# Patient Record
Sex: MALE | Race: WHITE | NOT HISPANIC OR LATINO | Employment: OTHER | URBAN - METROPOLITAN AREA
[De-identification: names, ages, dates, MRNs, and addresses within clinical notes are randomized per-mention and may not be internally consistent; named-entity substitution may affect disease eponyms.]

---

## 2024-02-16 ENCOUNTER — OFFICE VISIT (OUTPATIENT)
Dept: FAMILY MEDICINE CLINIC | Facility: CLINIC | Age: 64
End: 2024-02-16
Payer: MEDICARE

## 2024-02-16 VITALS
OXYGEN SATURATION: 93 % | RESPIRATION RATE: 20 BRPM | TEMPERATURE: 97.3 F | DIASTOLIC BLOOD PRESSURE: 68 MMHG | WEIGHT: 268.6 LBS | HEIGHT: 70 IN | SYSTOLIC BLOOD PRESSURE: 118 MMHG | BODY MASS INDEX: 38.45 KG/M2 | HEART RATE: 90 BPM

## 2024-02-16 DIAGNOSIS — I48.21 PERMANENT ATRIAL FIBRILLATION: Primary | ICD-10-CM

## 2024-02-16 DIAGNOSIS — F17.210 CIGARETTE SMOKER: ICD-10-CM

## 2024-02-16 DIAGNOSIS — R41.3 MEMORY LOSS: ICD-10-CM

## 2024-02-16 DIAGNOSIS — I83.90 VARICOSE VEINS OF ANKLE: ICD-10-CM

## 2024-02-16 DIAGNOSIS — G93.1 ANOXIC BRAIN INJURY: ICD-10-CM

## 2024-02-16 DIAGNOSIS — R63.5 WEIGHT GAIN FOLLOWING GASTRIC BYPASS SURGERY: ICD-10-CM

## 2024-02-16 DIAGNOSIS — Z98.84 WEIGHT GAIN FOLLOWING GASTRIC BYPASS SURGERY: ICD-10-CM

## 2024-02-16 PROBLEM — I10 PRIMARY HYPERTENSION: Status: ACTIVE | Noted: 2024-02-16

## 2024-02-16 PROCEDURE — 3074F SYST BP LT 130 MM HG: CPT | Performed by: FAMILY MEDICINE

## 2024-02-16 PROCEDURE — 99204 OFFICE O/P NEW MOD 45 MIN: CPT | Performed by: FAMILY MEDICINE

## 2024-02-16 PROCEDURE — 3078F DIAST BP <80 MM HG: CPT | Performed by: FAMILY MEDICINE

## 2024-02-16 RX ORDER — LISINOPRIL 5 MG/1
5 TABLET ORAL DAILY
COMMUNITY

## 2024-03-10 ENCOUNTER — PATIENT MESSAGE (OUTPATIENT)
Dept: FAMILY MEDICINE CLINIC | Facility: CLINIC | Age: 64
End: 2024-03-10
Payer: MEDICARE

## 2024-03-10 DIAGNOSIS — M25.562 CHRONIC PAIN OF LEFT KNEE: Primary | ICD-10-CM

## 2024-03-10 DIAGNOSIS — G89.29 CHRONIC PAIN OF LEFT KNEE: Primary | ICD-10-CM

## 2024-03-11 NOTE — TELEPHONE ENCOUNTER
From: Paul Carbajal  To: Zeyad Rouse  Sent: 3/10/2024 9:18 PM EDT  Subject: Health Update    Dr. Rouse, this is Merly, mother of Paul Carbajal. We are new to you and saw you about a month ago. 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 I was so focused on being sure you knew about his cardiac arrest that I completely forgot to let you know about his leg/knee infection. In 2020 he developed an infection just under the right knee. He did surgery and removed the infection and inserted antibiotic beads. He seemed to be recovering. We moved from [place] to [place] in December 2021 and shortly after, the infection resurfaced. He was referred to an orthopedic surgeon at . He stated that the infection was likely caused when they jammed a needle into his knee to start the IV at the time of his cardiac arrest. He was keeping an eye on it and felt surgery was probably the best solution but my son was very resistant to that. I could not be persistent with that as I started having health issues   myself. Now his left leg is hurting and he can barely walk but not sure why.   I also neglected to tell you that we obtained health insurance for him and his care was taken over by [facility] in [place]. That is where he was also treated for the knee/leg infection and his care was continued there until we moved to [place].    I thank you and appreciate you very much. If you have any suggestions for helping with his pain in the left leg I would appreciate it.

## 2025-02-19 DIAGNOSIS — I48.21 PERMANENT ATRIAL FIBRILLATION: ICD-10-CM

## 2025-02-19 RX ORDER — APIXABAN 5 MG/1
5 TABLET, FILM COATED ORAL 2 TIMES DAILY
Qty: 60 TABLET | Refills: 1 | Status: SHIPPED | OUTPATIENT
Start: 2025-02-19

## 2025-04-07 ENCOUNTER — TELEPHONE (OUTPATIENT)
Dept: FAMILY MEDICINE CLINIC | Facility: CLINIC | Age: 65
End: 2025-04-07

## 2025-04-07 NOTE — TELEPHONE ENCOUNTER
Caller: DENNISE ALEXANDER    Relationship to patient: Mother    Best call back number: [phone]     Type of visit: SUBSEQUENT MEDICARE WELLNESS VISIT    Requested date: 04/15/25     If rescheduling, when is the original appointment: 04/07/25    Additional notes:PATIENT HAD TO RESCHEDULE APPOINTMENT DUE TO FLOODS BUT WILL BE SEEING A PA INSTEAD OF HIS PCP DUE TO WANTING TO GET IN [place]ON[place]

## 2025-04-15 ENCOUNTER — OFFICE VISIT (OUTPATIENT)
Dept: FAMILY MEDICINE CLINIC | Facility: CLINIC | Age: 65
End: 2025-04-15
Payer: MEDICARE

## 2025-04-15 VITALS
HEIGHT: 70 IN | BODY MASS INDEX: 38.08 KG/M2 | WEIGHT: 266 LBS | HEART RATE: 88 BPM | SYSTOLIC BLOOD PRESSURE: 126 MMHG | RESPIRATION RATE: 18 BRPM | DIASTOLIC BLOOD PRESSURE: 78 MMHG | OXYGEN SATURATION: 95 %

## 2025-04-15 DIAGNOSIS — Z12.2 ENCOUNTER FOR SCREENING FOR LUNG CANCER: ICD-10-CM

## 2025-04-15 DIAGNOSIS — I48.21 PERMANENT ATRIAL FIBRILLATION: ICD-10-CM

## 2025-04-15 DIAGNOSIS — Z87.891 PERSONAL HISTORY OF NICOTINE DEPENDENCE: ICD-10-CM

## 2025-04-15 DIAGNOSIS — H93.11 TINNITUS OF RIGHT EAR: ICD-10-CM

## 2025-04-15 DIAGNOSIS — Z00.00 MEDICARE ANNUAL WELLNESS VISIT, SUBSEQUENT: Primary | ICD-10-CM

## 2025-04-15 DIAGNOSIS — G47.00 INSOMNIA, UNSPECIFIED TYPE: ICD-10-CM

## 2025-04-15 RX ORDER — TRAZODONE HYDROCHLORIDE 50 MG/1
50 TABLET ORAL NIGHTLY
Qty: 30 TABLET | Refills: 5 | Status: SHIPPED | OUTPATIENT
Start: 2025-04-15

## 2025-04-15 NOTE — PROGRESS NOTES
QUICK REFERENCE INFORMATION:  The ABCs of Providing the Annual Wellness Visit   CMS[employer] Medicare Subsequent Wellness Visit      Subjective   History of Present Illness    Paul Carbajal is a 64 y.o. male who presents for an Subsequent Wellness Visit. In addition, we addressed the following health issues:    Past medical history is significant for atrial fibrillation and hypertension.    Atrial fibrillation is currently managed with Eliquis.  Patient is tolerating medication well and denies adverse effects.  Patient has not seen cardiology in quite some time but is not interested in a referral.  Denies any recent A-fib episodes.  Denies chest pain or shortness of breath.    Hypertension is currently well-controlled without medication.    Complains of ringing in the right ear for the last 2 years.  Denies any dizziness, nausea, or vomiting.  Admits that the ringing is constant.  Has not had his hearing checked in since symptoms began.  No recent falls.  Does admit to some difficulty with movement and balance but attributes that to strength and coordination.  Admits to a cognitive deficit that is chronic since previous anoxic brain injury, denies any recent changes in cognition.    Also complains of difficulty staying asleep.  States he will fall asleep easily but will wake multiple times throughout the night.  Has tried melatonin OTC but experienced side effects including hallucinations and agitation.    Admits to a well-balanced diet.  Drinks plenty of water.  Admits to a sedentary lifestyle.    PMH, PSH, SocHx, FamHx, Allergies, and Medications: Reviewed and updated in the Visit Navigator.     Outpatient Medications Prior to Visit   Medication Sig Dispense Refill    apixaban (Eliquis) 5 MG tablet tablet TAKE 1 TABLET BY MOUTH TWICE DAILY 60 tablet 1    lisinopril (PRINIVIL,ZESTRIL) 5 MG tablet Take 1 tablet by mouth Daily. (Patient not taking: Reported on 2/16/2024)       No  facility-administered medications prior to visit.       Patient Active Problem List   Diagnosis    Permanent atrial fibrillation    Memory loss    Cigarette smoker    Weight gain following gastric bypass surgery    Varicose veins of ankle    Anoxic brain injury    Primary hypertension       Health Habits:  Dental Exam. not up to date - no teeth  Eye Exam. not up to date - 2017  Exercise: 0 times/week.  Current exercise activities include: none    Social:  See review in SnapShot activity and in SocHx section of Visit Navigator.    Health Risk Assessment:  The patient has completed a Health Risk Assessment. This has been reviewed with them and has been scanned into Media Manager as a separate document.    Current Medical Providers:  Patient Care Team:  Zeyad Rouse MD as PCP - General (Family Medicine)    The [facility] providers who are involved in the care of this patient are listed above. Additional providers and suppliers are listed below:  None    Recent Hospitalizations:  No recent hospitalization(s)..    Age-appropriate Screening Schedule:  Refer to the list below for future screening recommendations based on patient's age. Orders for these recommended tests are listed in the plan section. The patient has been provided with a written plan.    Health Maintenance   Topic Date Due    Pneumococcal Vaccine 50+ (1 of 2 - PCV) Never done    TDAP/TD VACCINES (1 - Tdap) Never done    ZOSTER VACCINE (1 of 2) Never done    LUNG CANCER SCREENING  Never done    COVID-19 Vaccine (1 - 2024-25 season) Never done    COLORECTAL CANCER SCREENING  04/15/2026 (Originally 8/2/2006)    INFLUENZA VACCINE  07/01/2025    ANNUAL WELLNESS VISIT  04/15/2026    HEPATITIS C SCREENING  Completed       Depression Screen:       4/15/2025     2:09 PM   PHQ-2/PHQ-9 Depression Screening   Little interest or pleasure in doing things Almost all   Feeling down, depressed, or hopeless Several days   Trouble falling or staying asleep, or  sleeping too much Almost all   Feeling tired or having little energy Almost all   Poor appetite or overeating Not at all   Feeling bad about yourself - or that you are a failure or have let yourself or your family down Not at all   Trouble concentrating on things, such as reading the newspaper or watching television Not at all   Moving or speaking so slowly that other people could have noticed? Or the opposite - being so fidgety or restless that you have been moving around a lot more than usual. Not at all   Thoughts that you would be better off dead or hurting yourself in some way Not at all   Patient Health Questionnaire-9 Score 10   How difficult have these problems made it for you to do your work, take care of things at home, or get along with other people? Not difficult at all       Functional and Cognitive Screenin/11/2025    11:34 AM   Functional & Cognitive Status   Do you have difficulty preparing food and eating? No   Do you have difficulty bathing yourself, getting dressed or grooming yourself? No   Do you have difficulty using the toilet? No   Do you have difficulty moving around from place to place? No   Do you have trouble with steps or getting out of a bed or a chair? No   Current Diet Limited Junk Food   Dental Exam Other   Eye Exam Other   Exercise (times per week) 0 times per week   Current Exercises Include No Regular Exercise   Do you need help using the phone?  No   Are you deaf or do you have serious difficulty hearing?  No   Do you need help to go to places out of walking distance? No   Do you need help shopping? No   Do you need help preparing meals?  Yes   Do you need help with housework?  Yes   Do you need help with laundry? Yes   Do you need help taking your medications? Yes   Do you need help managing money? Yes   Do you ever drive or ride in a car without wearing a seat belt? No   Have you felt unusual stress, anger or loneliness in the last month? No   Who do you live with?  "Other   If you need help, do you have trouble finding someone available to you? No   Have you been bothered in the last four weeks by sexual problems? No   Do you have difficulty concentrating, remembering or making decisions? Yes       Does the patient have evidence of cognitive impairment? Yes    Identification of Risk Factors:  Risk factors include: Abdominal Aortic Aneurysm Screening  Colon Cancer Screening  Diabetic Lab Screening   Immunizations Discussed/Encouraged (specific immunizations; Tdap, Prevnar 20 (Pneumococcal 20-valent conjugate), and Shingrix )  Lung Cancer Risk  Prostate Cancer Screening .    Review of Systems    A comprehensive review of systems was negative except for: Ears, nose, mouth, throat, and face: positive for tinnitus    Objective     Vitals:    04/15/25 1408   BP: 126/78   BP Location: Right arm   Patient Position: Sitting   Cuff Size: Adult   Pulse: 88   Resp: 18   SpO2: 95%   Weight: 121 kg (266 lb)   Height: 177.8 cm (70\")   PainSc: 0-No pain      Physical Exam  Vitals and nursing note reviewed.   Constitutional:       Appearance: Normal appearance.   HENT:      Head: Normocephalic and atraumatic.      Right Ear: Ear canal and external ear normal. There is impacted cerumen.      Left Ear: Tympanic membrane, ear canal and external ear normal.      Nose: Nose normal.      Mouth/Throat:      Pharynx: Oropharynx is clear.   Eyes:      Conjunctiva/sclera: Conjunctivae normal.   Cardiovascular:      Rate and Rhythm: Normal rate and regular rhythm.      Heart sounds: No murmur heard.     No friction rub. No gallop.   Pulmonary:      Effort: Pulmonary effort is normal.      Breath sounds: Wheezing present. No rhonchi or rales.   Abdominal:      General: Abdomen is flat. Bowel sounds are normal. There is no distension.      Palpations: Abdomen is soft.      Tenderness: There is no abdominal tenderness. There is no guarding.   Lymphadenopathy:      Head:      Right side of head: No " submandibular or tonsillar adenopathy.      Left side of head: No submandibular or tonsillar adenopathy.   Skin:     General: Skin is warm and dry.   Neurological:      General: No focal deficit present.      Mental Status: He is alert and oriented to person, place, and time.   Psychiatric:         Mood and Affect: Mood normal.         Behavior: Behavior normal.          Body mass index is 38.17 kg/m².    Assessment & Plan   Patient Self-Management and Personalized Health Advice  The patient has been provided with information about: diet, exercise, prevention of cardiac or vascular disease, tobacco cessation, and fall prevention and preventive services including:   Annual Wellness Visit (AWV)  Cardiovascular Disease Screening Tests (may do this order every 5 years in beneficiaries without signs or symptoms of cardiovascular disease)  Colorectal Cancer Screening, Colonoscopy  Counseling to Prevent Tobacco Use (use of smartset and @cessation@ smartphrase for documentation)  Depression Screening (15 minutes face to face, Code )  Lung Cancer Screening Counseling and Annual Screening for Lung Cancer with Low Dose Computed Tomography (LDCT); (use of smartset for low dose CT for lung cancer screening for documentation and orders)  Ultrasound Screening for Abdominal Aortic Aneurysm (AAA).    Discussed the patient's BMI with him. The BMI is above average; BMI management plan is completed.    Orders:  Orders Placed This Encounter   Procedures     CT Chest Low Dose Cancer Screening WO    Comprehensive Metabolic Panel    Lipid Panel    Hemoglobin A1c    Ambulatory Referral to Audiology    CBC & Differential     Discussed the importance of a healthy, well-balanced diet, active lifestyle, and adequate hydration.  Patient denied colorectal cancer screening and screening for an abdominal aneurysm.  Complete blood work and lung cancer screening.  Discussed vaccinations for patient to consider.  Will refer to audiology for  hearing screening.  Did attempt to clean out right ear during visit.  Initiate trazodone for management of insomnia.  Discussed mechanism of medication and potential adverse effects during visit.  Return if symptoms fail to improve.    Paul Carbajal  reports that he has been smoking cigarettes. He started smoking about 49 years ago. He has a 24.6 pack-year smoking history. He has quit using smokeless tobacco. I have educated him on the risk of diseases from using tobacco products such as cancer, COPD, and heart disease.     I advised him to quit and he is not willing to quit.    I spent 5 minutes counseling the patient.    Follow Up:  Return in about 1 year (around 4/15/2026) for Medicare Wellness.     An After Visit Summary and PPPS with all of these plans were given to the patient.

## 2025-04-16 LAB
ALBUMIN SERPL-MCNC: 4.2 G/DL (ref 3.9–4.9)
ALP SERPL-CCNC: 66 IU/L (ref 44–121)
ALT SERPL-CCNC: 11 IU/L (ref 0–44)
AST SERPL-CCNC: 23 IU/L (ref 0–40)
BASOPHILS # BLD AUTO: 0 X10E3/UL (ref 0–0.2)
BASOPHILS NFR BLD AUTO: 1 %
BILIRUB SERPL-MCNC: 0.4 MG/DL (ref 0–1.2)
BUN SERPL-MCNC: 11 MG/DL (ref 8–27)
BUN/CREAT SERPL: 15 (ref 10–24)
CALCIUM SERPL-MCNC: 9.6 MG/DL (ref 8.6–10.2)
CHLORIDE SERPL-SCNC: 102 MMOL/L (ref 96–106)
CHOLEST SERPL-MCNC: 154 MG/DL (ref 100–199)
CO2 SERPL-SCNC: 22 MMOL/L (ref 20–29)
CREAT SERPL-MCNC: 0.73 MG/DL (ref 0.76–1.27)
EGFRCR SERPLBLD CKD-EPI 2021: 102 ML/MIN/1.73
EOSINOPHIL # BLD AUTO: 0.1 X10E3/UL (ref 0–0.4)
EOSINOPHIL NFR BLD AUTO: 2 %
ERYTHROCYTE [DISTWIDTH] IN BLOOD BY AUTOMATED COUNT: 17.4 % (ref 11.6–15.4)
GLOBULIN SER CALC-MCNC: 2.8 G/DL (ref 1.5–4.5)
GLUCOSE SERPL-MCNC: 97 MG/DL (ref 70–99)
HBA1C MFR BLD: 5.9 % (ref 4.8–5.6)
HCT VFR BLD AUTO: 36.1 % (ref 37.5–51)
HDLC SERPL-MCNC: 45 MG/DL
HGB BLD-MCNC: 11 G/DL (ref 13–17.7)
IMM GRANULOCYTES # BLD AUTO: 0.2 X10E3/UL (ref 0–0.1)
IMM GRANULOCYTES NFR BLD AUTO: 4 %
LDLC SERPL CALC-MCNC: 85 MG/DL (ref 0–99)
LYMPHOCYTES # BLD AUTO: 2.1 X10E3/UL (ref 0.7–3.1)
LYMPHOCYTES NFR BLD AUTO: 43 %
MCH RBC QN AUTO: 24.8 PG (ref 26.6–33)
MCHC RBC AUTO-ENTMCNC: 30.5 G/DL (ref 31.5–35.7)
MCV RBC AUTO: 81 FL (ref 79–97)
MONOCYTES # BLD AUTO: 0.8 X10E3/UL (ref 0.1–0.9)
MONOCYTES NFR BLD AUTO: 16 %
NEUTROPHILS # BLD AUTO: 1.6 X10E3/UL (ref 1.4–7)
NEUTROPHILS NFR BLD AUTO: 34 %
PLATELET # BLD AUTO: 223 X10E3/UL (ref 150–450)
POTASSIUM SERPL-SCNC: 4.8 MMOL/L (ref 3.5–5.2)
PROT SERPL-MCNC: 7 G/DL (ref 6–8.5)
RBC # BLD AUTO: 4.44 X10E6/UL (ref 4.14–5.8)
SODIUM SERPL-SCNC: 140 MMOL/L (ref 134–144)
TRIGL SERPL-MCNC: 134 MG/DL (ref 0–149)
VLDLC SERPL CALC-MCNC: 24 MG/DL (ref 5–40)
WBC # BLD AUTO: 4.7 X10E3/UL (ref 3.4–10.8)

## 2025-06-11 ENCOUNTER — TELEPHONE (OUTPATIENT)
Dept: FAMILY MEDICINE CLINIC | Facility: CLINIC | Age: 65
End: 2025-06-11
Payer: MEDICARE

## 2025-06-11 NOTE — TELEPHONE ENCOUNTER
Caller: AALIYAH CARDOSO    Relationship:     Best call back number: [phone] REF- [phone]    What is the best time to reach you: ANYTIME     Who are you requesting to speak with (clinical staff, provider,  specific staff member): CLINICAL STAFF     What was the call regarding: [facility]. THEY ARE SHOWING THAT THE PATIENT HAS A HISTORY OF CARDIO ISSUES. THEY ARE WANTING TO SEE IF THE PATIENT IS ALREADY BEEN GIVEN A STATIN MEDICATION OR IF THERE IS A BARRIER KEEPING THIS FROM BEING DONE. THEY SAY THAT THEY HAVE FAXED THE OFFICE ABOUT THIS.     Is it okay if the provider responds through MyChart: NO

## 2025-06-26 DIAGNOSIS — I48.21 PERMANENT ATRIAL FIBRILLATION: ICD-10-CM
